# Patient Record
Sex: MALE | Race: WHITE | HISPANIC OR LATINO | Employment: OTHER
[De-identification: names, ages, dates, MRNs, and addresses within clinical notes are randomized per-mention and may not be internally consistent; named-entity substitution may affect disease eponyms.]

---

## 2019-04-26 NOTE — PATIENT DISCUSSION
CHALAZION OD: PRESCRIBED WARM COMPRESSES, EYELID SCRUBS AND DOXYCYCLINE 100MG BID PO X 2 WEEKS AND TOBRADEX ALIRIO QHS X 2 WEEKS. ADD KENALOG INJECTION TODAY.

## 2021-07-29 ENCOUNTER — PREPPED CHART (OUTPATIENT)
Age: 76
End: 2021-07-29

## 2021-08-03 ENCOUNTER — ESTABLISHED COMPREHENSIVE EXAM (OUTPATIENT)
Age: 76
End: 2021-08-03

## 2021-08-03 DIAGNOSIS — H35.3132: ICD-10-CM

## 2021-08-03 PROCEDURE — 92014 COMPRE OPH EXAM EST PT 1/>: CPT

## 2021-08-03 PROCEDURE — 92134 CPTRZ OPH DX IMG PST SGM RTA: CPT

## 2021-08-03 PROCEDURE — 92015VEC REFRACTION-VEC

## 2021-08-03 ASSESSMENT — VISUAL ACUITY
OS_CC: J1+
OD_CC: 20/20
OD_CC: J1+
OS_CC: 20/25

## 2021-08-03 ASSESSMENT — TONOMETRY
OS_IOP_MMHG: 15
OD_IOP_MMHG: 16

## 2022-08-24 ENCOUNTER — COMPREHENSIVE EXAM (OUTPATIENT)
Dept: URBAN - METROPOLITAN AREA CLINIC 27 | Facility: CLINIC | Age: 77
End: 2022-08-24

## 2022-08-24 DIAGNOSIS — H25.813: ICD-10-CM

## 2022-08-24 DIAGNOSIS — E11.9: ICD-10-CM

## 2022-08-24 DIAGNOSIS — H35.3132: ICD-10-CM

## 2022-08-24 PROCEDURE — 92014 COMPRE OPH EXAM EST PT 1/>: CPT

## 2022-08-24 PROCEDURE — 92015 DETERMINE REFRACTIVE STATE: CPT

## 2022-08-24 ASSESSMENT — TONOMETRY
OS_IOP_MMHG: 13
OD_IOP_MMHG: 13

## 2022-08-24 ASSESSMENT — VISUAL ACUITY
OD_CC: 20/25+2
OD_CC: J1+
OS_CC: 20/25-2
OS_CC: J1+

## 2023-11-27 ENCOUNTER — COMPREHENSIVE EXAM (OUTPATIENT)
Dept: URBAN - METROPOLITAN AREA CLINIC 27 | Facility: CLINIC | Age: 78
End: 2023-11-27

## 2023-11-27 DIAGNOSIS — H25.813: ICD-10-CM

## 2023-11-27 DIAGNOSIS — H35.3132: ICD-10-CM

## 2023-11-27 DIAGNOSIS — H52.03: ICD-10-CM

## 2023-11-27 PROCEDURE — 92014 COMPRE OPH EXAM EST PT 1/>: CPT

## 2023-11-27 PROCEDURE — 92015 DETERMINE REFRACTIVE STATE: CPT

## 2023-11-27 ASSESSMENT — VISUAL ACUITY
OD_CC: 20/200+1
OS_CC: 20/25+1
OD_PH: 20/40
OU_CC: J1+

## 2023-11-27 ASSESSMENT — TONOMETRY
OD_IOP_MMHG: 14
OS_IOP_MMHG: 14

## 2023-12-01 ENCOUNTER — CONSULTATION/EVALUATION (OUTPATIENT)
Dept: URBAN - METROPOLITAN AREA CLINIC 29 | Facility: CLINIC | Age: 78
End: 2023-12-01

## 2023-12-01 DIAGNOSIS — H25.813: ICD-10-CM

## 2023-12-01 DIAGNOSIS — H35.3132: ICD-10-CM

## 2023-12-01 DIAGNOSIS — H52.203: ICD-10-CM

## 2023-12-01 PROCEDURE — 99204 OFFICE O/P NEW MOD 45 MIN: CPT

## 2023-12-01 PROCEDURE — 92136 OPHTHALMIC BIOMETRY: CPT

## 2023-12-01 ASSESSMENT — TONOMETRY
OS_IOP_MMHG: 14
OD_IOP_MMHG: 12

## 2023-12-01 ASSESSMENT — VISUAL ACUITY
OS_CC: 20/60
OD_CC: 20/25
OS_CC: 20/25
OD_GLARE: 20/50
OD_CC: 20/200
OS_GLARE: 20/50

## 2023-12-13 ENCOUNTER — SURGERY/PROCEDURE (OUTPATIENT)
Dept: URBAN - METROPOLITAN AREA SURGERY 17 | Facility: SURGERY | Age: 78
End: 2023-12-13

## 2023-12-13 DIAGNOSIS — H25.811: ICD-10-CM

## 2023-12-13 PROCEDURE — 66984 XCAPSL CTRC RMVL W/O ECP: CPT

## 2023-12-14 ENCOUNTER — POST OP/EVAL OF SECOND EYE (OUTPATIENT)
Dept: URBAN - METROPOLITAN AREA CLINIC 29 | Facility: CLINIC | Age: 78
End: 2023-12-14

## 2023-12-14 DIAGNOSIS — Z96.1: ICD-10-CM

## 2023-12-14 ASSESSMENT — VISUAL ACUITY
OD_SC: 20/20-1
OS_CC: 20/25-1

## 2023-12-14 ASSESSMENT — TONOMETRY
OS_IOP_MMHG: 18
OD_IOP_MMHG: 21

## 2023-12-18 ENCOUNTER — SURGERY/PROCEDURE (OUTPATIENT)
Dept: URBAN - METROPOLITAN AREA SURGERY 17 | Facility: SURGERY | Age: 78
End: 2023-12-18

## 2023-12-18 DIAGNOSIS — H25.812: ICD-10-CM

## 2023-12-18 PROCEDURE — 66984 XCAPSL CTRC RMVL W/O ECP: CPT

## 2023-12-18 PROCEDURE — 92136 OPHTHALMIC BIOMETRY: CPT

## 2023-12-19 ENCOUNTER — POST-OP (OUTPATIENT)
Dept: URBAN - METROPOLITAN AREA CLINIC 27 | Facility: CLINIC | Age: 78
End: 2023-12-19

## 2023-12-19 DIAGNOSIS — Z96.1: ICD-10-CM

## 2023-12-19 PROCEDURE — 99024 POSTOP FOLLOW-UP VISIT: CPT

## 2023-12-19 ASSESSMENT — TONOMETRY
OD_IOP_MMHG: 14
OS_IOP_MMHG: 25

## 2023-12-19 ASSESSMENT — VISUAL ACUITY
OS_SC: 20/20-1
OD_SC: 20/30

## 2023-12-26 ENCOUNTER — POST-OP (OUTPATIENT)
Dept: URBAN - METROPOLITAN AREA CLINIC 27 | Facility: CLINIC | Age: 78
End: 2023-12-26

## 2023-12-26 DIAGNOSIS — Z96.1: ICD-10-CM

## 2023-12-26 PROCEDURE — 99024 POSTOP FOLLOW-UP VISIT: CPT

## 2023-12-26 ASSESSMENT — VISUAL ACUITY
OS_SC: 20/25
OD_SC: 20/25

## 2023-12-26 ASSESSMENT — TONOMETRY
OD_IOP_MMHG: 14
OS_IOP_MMHG: 13

## 2024-01-03 ENCOUNTER — POST-OP (OUTPATIENT)
Dept: URBAN - METROPOLITAN AREA CLINIC 27 | Facility: CLINIC | Age: 79
End: 2024-01-03

## 2024-01-03 DIAGNOSIS — Z96.1: ICD-10-CM

## 2024-01-03 PROCEDURE — 99024 POSTOP FOLLOW-UP VISIT: CPT

## 2024-01-03 ASSESSMENT — TONOMETRY
OS_IOP_MMHG: 12
OD_IOP_MMHG: 12

## 2024-01-03 ASSESSMENT — VISUAL ACUITY
OS_SC: 20/25
OD_SC: 20/25

## 2024-01-17 ENCOUNTER — POST-OP (OUTPATIENT)
Dept: URBAN - METROPOLITAN AREA CLINIC 29 | Facility: CLINIC | Age: 79
End: 2024-01-17

## 2024-01-17 DIAGNOSIS — Z96.1: ICD-10-CM

## 2024-01-17 ASSESSMENT — TONOMETRY
OS_IOP_MMHG: 12
OD_IOP_MMHG: 12

## 2024-01-17 ASSESSMENT — VISUAL ACUITY
OD_SC: 20/40
OS_SC: 20/30
OD_PH: 20/20

## 2024-07-03 ENCOUNTER — COMPREHENSIVE EXAM (OUTPATIENT)
Dept: URBAN - METROPOLITAN AREA CLINIC 27 | Facility: CLINIC | Age: 79
End: 2024-07-03

## 2024-07-03 DIAGNOSIS — H04.123: ICD-10-CM

## 2024-07-03 DIAGNOSIS — Z96.1: ICD-10-CM

## 2024-07-03 DIAGNOSIS — H52.223: ICD-10-CM

## 2024-07-03 PROCEDURE — 99214 OFFICE O/P EST MOD 30 MIN: CPT

## 2024-07-03 PROCEDURE — 92015 DETERMINE REFRACTIVE STATE: CPT

## 2024-07-03 ASSESSMENT — TONOMETRY
OD_IOP_MMHG: 12
OS_IOP_MMHG: 12

## 2024-07-03 ASSESSMENT — VISUAL ACUITY
OU_CC: J1+
OS_CC: 20/20-1
OD_CC: 20/20-1

## 2025-07-16 ENCOUNTER — COMPREHENSIVE EXAM (OUTPATIENT)
Age: 80
End: 2025-07-16

## 2025-07-16 DIAGNOSIS — H52.223: ICD-10-CM

## 2025-07-16 PROCEDURE — 92015 DETERMINE REFRACTIVE STATE: CPT

## 2025-07-16 PROCEDURE — 92014 COMPRE OPH EXAM EST PT 1/>: CPT
